# Patient Record
Sex: FEMALE | Race: WHITE
[De-identification: names, ages, dates, MRNs, and addresses within clinical notes are randomized per-mention and may not be internally consistent; named-entity substitution may affect disease eponyms.]

---

## 2017-06-10 ENCOUNTER — HEALTH MAINTENANCE LETTER (OUTPATIENT)
Age: 47
End: 2017-06-10

## 2017-10-27 ENCOUNTER — TRANSFERRED RECORDS (OUTPATIENT)
Dept: HEALTH INFORMATION MANAGEMENT | Facility: CLINIC | Age: 47
End: 2017-10-27

## 2017-12-26 RX ORDER — ERGOCALCIFEROL 1.25 MG/1
50000 CAPSULE, LIQUID FILLED ORAL WEEKLY
COMMUNITY

## 2017-12-27 ENCOUNTER — ANESTHESIA EVENT (OUTPATIENT)
Dept: SURGERY | Facility: CLINIC | Age: 47
End: 2017-12-27
Payer: MEDICARE

## 2017-12-27 ENCOUNTER — HOSPITAL ENCOUNTER (OUTPATIENT)
Facility: CLINIC | Age: 47
Discharge: HOME | End: 2017-12-27
Attending: ORTHOPAEDIC SURGERY | Admitting: ORTHOPAEDIC SURGERY
Payer: MEDICARE

## 2017-12-27 ENCOUNTER — ANESTHESIA (OUTPATIENT)
Dept: SURGERY | Facility: CLINIC | Age: 47
End: 2017-12-27
Payer: MEDICARE

## 2017-12-27 VITALS
DIASTOLIC BLOOD PRESSURE: 74 MMHG | BODY MASS INDEX: 27.79 KG/M2 | HEIGHT: 64 IN | OXYGEN SATURATION: 95 % | SYSTOLIC BLOOD PRESSURE: 110 MMHG | RESPIRATION RATE: 16 BRPM | WEIGHT: 162.8 LBS | TEMPERATURE: 97.9 F

## 2017-12-27 DIAGNOSIS — Z98.890 S/P MEDIAL MENISCECTOMY OF LEFT KNEE: Primary | ICD-10-CM

## 2017-12-27 PROCEDURE — 71000027 ZZH RECOVERY PHASE 2 EACH 15 MINS: Performed by: ORTHOPAEDIC SURGERY

## 2017-12-27 PROCEDURE — 25000128 H RX IP 250 OP 636: Performed by: ANESTHESIOLOGY

## 2017-12-27 PROCEDURE — 25000128 H RX IP 250 OP 636: Performed by: PHYSICIAN ASSISTANT

## 2017-12-27 PROCEDURE — 37000009 ZZH ANESTHESIA TECHNICAL FEE, EACH ADDTL 15 MIN: Performed by: ORTHOPAEDIC SURGERY

## 2017-12-27 PROCEDURE — 71000013 ZZH RECOVERY PHASE 1 LEVEL 1 EA ADDTL HR: Performed by: ORTHOPAEDIC SURGERY

## 2017-12-27 PROCEDURE — 25000128 H RX IP 250 OP 636: Performed by: ORTHOPAEDIC SURGERY

## 2017-12-27 PROCEDURE — 36000058 ZZH SURGERY LEVEL 3 EA 15 ADDTL MIN: Performed by: ORTHOPAEDIC SURGERY

## 2017-12-27 PROCEDURE — 25000128 H RX IP 250 OP 636: Performed by: NURSE ANESTHETIST, CERTIFIED REGISTERED

## 2017-12-27 PROCEDURE — 36000056 ZZH SURGERY LEVEL 3 1ST 30 MIN: Performed by: ORTHOPAEDIC SURGERY

## 2017-12-27 PROCEDURE — 27210995 ZZH RX 272: Performed by: ORTHOPAEDIC SURGERY

## 2017-12-27 PROCEDURE — 25000132 ZZH RX MED GY IP 250 OP 250 PS 637: Mod: GY | Performed by: PHYSICIAN ASSISTANT

## 2017-12-27 PROCEDURE — 25000125 ZZHC RX 250: Performed by: NURSE ANESTHETIST, CERTIFIED REGISTERED

## 2017-12-27 PROCEDURE — 37000008 ZZH ANESTHESIA TECHNICAL FEE, 1ST 30 MIN: Performed by: ORTHOPAEDIC SURGERY

## 2017-12-27 PROCEDURE — A9270 NON-COVERED ITEM OR SERVICE: HCPCS | Mod: GY | Performed by: PHYSICIAN ASSISTANT

## 2017-12-27 PROCEDURE — 27210794 ZZH OR GENERAL SUPPLY STERILE: Performed by: ORTHOPAEDIC SURGERY

## 2017-12-27 PROCEDURE — 25000125 ZZHC RX 250: Performed by: ORTHOPAEDIC SURGERY

## 2017-12-27 PROCEDURE — 25000566 ZZH SEVOFLURANE, EA 15 MIN: Performed by: ORTHOPAEDIC SURGERY

## 2017-12-27 PROCEDURE — 71000012 ZZH RECOVERY PHASE 1 LEVEL 1 FIRST HR: Performed by: ORTHOPAEDIC SURGERY

## 2017-12-27 PROCEDURE — 40000170 ZZH STATISTIC PRE-PROCEDURE ASSESSMENT II: Performed by: ORTHOPAEDIC SURGERY

## 2017-12-27 RX ORDER — PROPOFOL 10 MG/ML
INJECTION, EMULSION INTRAVENOUS CONTINUOUS PRN
Status: DISCONTINUED | OUTPATIENT
Start: 2017-12-27 | End: 2017-12-27

## 2017-12-27 RX ORDER — SODIUM CHLORIDE, SODIUM LACTATE, POTASSIUM CHLORIDE, CALCIUM CHLORIDE 600; 310; 30; 20 MG/100ML; MG/100ML; MG/100ML; MG/100ML
INJECTION, SOLUTION INTRAVENOUS CONTINUOUS PRN
Status: DISCONTINUED | OUTPATIENT
Start: 2017-12-27 | End: 2017-12-27

## 2017-12-27 RX ORDER — CEFAZOLIN SODIUM 2 G/100ML
2 INJECTION, SOLUTION INTRAVENOUS
Status: COMPLETED | OUTPATIENT
Start: 2017-12-27 | End: 2017-12-27

## 2017-12-27 RX ORDER — BUPIVACAINE HYDROCHLORIDE 2.5 MG/ML
INJECTION, SOLUTION EPIDURAL; INFILTRATION; INTRACAUDAL PRN
Status: DISCONTINUED | OUTPATIENT
Start: 2017-12-27 | End: 2017-12-27 | Stop reason: HOSPADM

## 2017-12-27 RX ORDER — ONDANSETRON 4 MG/1
4 TABLET, ORALLY DISINTEGRATING ORAL EVERY 30 MIN PRN
Status: DISCONTINUED | OUTPATIENT
Start: 2017-12-27 | End: 2017-12-27 | Stop reason: HOSPADM

## 2017-12-27 RX ORDER — OXYCODONE HYDROCHLORIDE 5 MG/1
5-10 TABLET ORAL
Qty: 30 TABLET | Refills: 0 | Status: SHIPPED | OUTPATIENT
Start: 2017-12-27

## 2017-12-27 RX ORDER — MEPERIDINE HYDROCHLORIDE 25 MG/ML
12.5 INJECTION INTRAMUSCULAR; INTRAVENOUS; SUBCUTANEOUS
Status: DISCONTINUED | OUTPATIENT
Start: 2017-12-27 | End: 2017-12-27 | Stop reason: HOSPADM

## 2017-12-27 RX ORDER — ONDANSETRON 2 MG/ML
4 INJECTION INTRAMUSCULAR; INTRAVENOUS EVERY 30 MIN PRN
Status: DISCONTINUED | OUTPATIENT
Start: 2017-12-27 | End: 2017-12-27 | Stop reason: HOSPADM

## 2017-12-27 RX ORDER — OXYCODONE HYDROCHLORIDE 5 MG/1
5 TABLET ORAL
Status: COMPLETED | OUTPATIENT
Start: 2017-12-27 | End: 2017-12-27

## 2017-12-27 RX ORDER — LIDOCAINE HYDROCHLORIDE 20 MG/ML
INJECTION, SOLUTION INFILTRATION; PERINEURAL PRN
Status: DISCONTINUED | OUTPATIENT
Start: 2017-12-27 | End: 2017-12-27

## 2017-12-27 RX ORDER — FENTANYL CITRATE 50 UG/ML
INJECTION, SOLUTION INTRAMUSCULAR; INTRAVENOUS PRN
Status: DISCONTINUED | OUTPATIENT
Start: 2017-12-27 | End: 2017-12-27

## 2017-12-27 RX ORDER — FENTANYL CITRATE 50 UG/ML
25-50 INJECTION, SOLUTION INTRAMUSCULAR; INTRAVENOUS
Status: DISCONTINUED | OUTPATIENT
Start: 2017-12-27 | End: 2017-12-27 | Stop reason: HOSPADM

## 2017-12-27 RX ORDER — FENTANYL CITRATE 50 UG/ML
25-50 INJECTION, SOLUTION INTRAMUSCULAR; INTRAVENOUS EVERY 5 MIN PRN
Status: DISCONTINUED | OUTPATIENT
Start: 2017-12-27 | End: 2017-12-27 | Stop reason: HOSPADM

## 2017-12-27 RX ORDER — PHYSOSTIGMINE SALICYLATE 1 MG/ML
1.2 INJECTION INTRAVENOUS
Status: DISCONTINUED | OUTPATIENT
Start: 2017-12-27 | End: 2017-12-27 | Stop reason: HOSPADM

## 2017-12-27 RX ORDER — NALOXONE HYDROCHLORIDE 0.4 MG/ML
.1-.4 INJECTION, SOLUTION INTRAMUSCULAR; INTRAVENOUS; SUBCUTANEOUS
Status: DISCONTINUED | OUTPATIENT
Start: 2017-12-27 | End: 2017-12-27 | Stop reason: HOSPADM

## 2017-12-27 RX ORDER — SODIUM CHLORIDE, SODIUM LACTATE, POTASSIUM CHLORIDE, CALCIUM CHLORIDE 600; 310; 30; 20 MG/100ML; MG/100ML; MG/100ML; MG/100ML
INJECTION, SOLUTION INTRAVENOUS CONTINUOUS
Status: DISCONTINUED | OUTPATIENT
Start: 2017-12-27 | End: 2017-12-27 | Stop reason: HOSPADM

## 2017-12-27 RX ORDER — ONDANSETRON 2 MG/ML
INJECTION INTRAMUSCULAR; INTRAVENOUS PRN
Status: DISCONTINUED | OUTPATIENT
Start: 2017-12-27 | End: 2017-12-27

## 2017-12-27 RX ORDER — HYDROMORPHONE HYDROCHLORIDE 1 MG/ML
.3-.5 INJECTION, SOLUTION INTRAMUSCULAR; INTRAVENOUS; SUBCUTANEOUS EVERY 10 MIN PRN
Status: DISCONTINUED | OUTPATIENT
Start: 2017-12-27 | End: 2017-12-27 | Stop reason: HOSPADM

## 2017-12-27 RX ORDER — PROPOFOL 10 MG/ML
INJECTION, EMULSION INTRAVENOUS PRN
Status: DISCONTINUED | OUTPATIENT
Start: 2017-12-27 | End: 2017-12-27

## 2017-12-27 RX ORDER — ACETAMINOPHEN 325 MG/1
975 TABLET ORAL ONCE
Status: COMPLETED | OUTPATIENT
Start: 2017-12-27 | End: 2017-12-27

## 2017-12-27 RX ORDER — DEXAMETHASONE SODIUM PHOSPHATE 4 MG/ML
INJECTION, SOLUTION INTRA-ARTICULAR; INTRALESIONAL; INTRAMUSCULAR; INTRAVENOUS; SOFT TISSUE PRN
Status: DISCONTINUED | OUTPATIENT
Start: 2017-12-27 | End: 2017-12-27

## 2017-12-27 RX ORDER — BUPIVACAINE HYDROCHLORIDE 2.5 MG/ML
INJECTION, SOLUTION EPIDURAL; INFILTRATION; INTRACAUDAL
Status: DISCONTINUED
Start: 2017-12-27 | End: 2017-12-27 | Stop reason: HOSPADM

## 2017-12-27 RX ADMIN — DEXAMETHASONE SODIUM PHOSPHATE 4 MG: 4 INJECTION, SOLUTION INTRA-ARTICULAR; INTRALESIONAL; INTRAMUSCULAR; INTRAVENOUS; SOFT TISSUE at 13:06

## 2017-12-27 RX ADMIN — CEFAZOLIN SODIUM 2 G: 2 INJECTION, SOLUTION INTRAVENOUS at 12:54

## 2017-12-27 RX ADMIN — FENTANYL CITRATE 50 MCG: 50 INJECTION, SOLUTION INTRAMUSCULAR; INTRAVENOUS at 14:01

## 2017-12-27 RX ADMIN — PROPOFOL 200 MCG/KG/MIN: 10 INJECTION, EMULSION INTRAVENOUS at 12:50

## 2017-12-27 RX ADMIN — FENTANYL CITRATE 50 MCG: 50 INJECTION, SOLUTION INTRAMUSCULAR; INTRAVENOUS at 12:50

## 2017-12-27 RX ADMIN — PROPOFOL 200 MG: 10 INJECTION, EMULSION INTRAVENOUS at 12:50

## 2017-12-27 RX ADMIN — ONDANSETRON 4 MG: 2 INJECTION INTRAMUSCULAR; INTRAVENOUS at 13:15

## 2017-12-27 RX ADMIN — OXYCODONE HYDROCHLORIDE 5 MG: 5 TABLET ORAL at 15:26

## 2017-12-27 RX ADMIN — DEXMEDETOMIDINE HYDROCHLORIDE 16 MCG: 100 INJECTION, SOLUTION INTRAVENOUS at 13:23

## 2017-12-27 RX ADMIN — HYDROMORPHONE HYDROCHLORIDE 0.5 MG: 1 INJECTION, SOLUTION INTRAMUSCULAR; INTRAVENOUS; SUBCUTANEOUS at 14:38

## 2017-12-27 RX ADMIN — MIDAZOLAM 2 MG: 1 INJECTION INTRAMUSCULAR; INTRAVENOUS at 12:50

## 2017-12-27 RX ADMIN — FENTANYL CITRATE 50 MCG: 50 INJECTION, SOLUTION INTRAMUSCULAR; INTRAVENOUS at 14:08

## 2017-12-27 RX ADMIN — SODIUM CHLORIDE, POTASSIUM CHLORIDE, SODIUM LACTATE AND CALCIUM CHLORIDE: 600; 310; 30; 20 INJECTION, SOLUTION INTRAVENOUS at 12:47

## 2017-12-27 RX ADMIN — FENTANYL CITRATE 50 MCG: 50 INJECTION, SOLUTION INTRAMUSCULAR; INTRAVENOUS at 13:10

## 2017-12-27 RX ADMIN — LIDOCAINE HYDROCHLORIDE 60 MG: 20 INJECTION, SOLUTION INFILTRATION; PERINEURAL at 12:50

## 2017-12-27 RX ADMIN — ACETAMINOPHEN 975 MG: 325 TABLET, FILM COATED ORAL at 11:26

## 2017-12-27 NOTE — IP AVS SNAPSHOT
MRN:[id number]                      After Visit Summary   12/27/2017    Sivan Faye    MRN: [id number]           Thank you!     Thank you for choosing [facility] for your care. Our goal is always to provide you with excellent care. Hearing back from our patients is one way we can continue to improve our services. Please take a few minutes to complete the written survey that you may receive in the mail after you visit with us. Thank you!        Patient Information     Date Of Birth          1970        About your hospital stay     You were admitted on:  December 27, 2017 [facility]    You were discharged on:  December 27, 2017       Who to Call     For medical emergencies, please call 911.  For non-urgent questions about your medical care, please call your primary care provider or clinic, [phone]  For questions related to your surgery, please call your surgery clinic        Attending Provider     Provider Freddy Joe MD Orthopaedic Surgery       Primary Care Provider Office Phone # Fax #    Demario Angel Claros [phone]      After Care Instructions     Diet Instructions       Resume pre-procedure diet            Discharge Instructions       Follow up appointment as instructed by Surgeon and or RN            Discharge Instructions       Please provide Dr Freddy Sims's knee arthroscopy discharge instructions            Do not - immerse incision in water until sutures removed       Do not immerse incision in water until sutures removed            Dressing       Keep dressing clean and dry.  Dressing / incisional care as instructed by RN and or Surgeon            No Alcohol       For 24 hours post procedure            No driving or operating machinery        until the day after procedure            Shower       No shower for 24 hours post procedure. May shower Postoperative Day (POD)  2             Weight bearing status - As tolerated                 Your next 10 appointments already scheduled     Dec 29, 2017 12:00 PM CST   (Arrive by 11:45 AM)   New Patient Visit with Sohan Pedersen MD   [facility] ([facility])    [address] [id number] [phone]              Further instructions from your care team       While you were at the hospital today you were given 975 mg of Tylenol at 1126 AM. The recommended daily maximum dose is 4000 mg.     Same Day Surgery Discharge Instructions for  Sedation and General Anesthesia       It's not unusual to feel dizzy, light-headed or faint for up to 24 hours after surgery or while taking pain medication.  If you have these symptoms: sit for a few minutes before standing and have someone assist you when you get up to walk or use the bathroom.      You should rest and relax for the next 24 hours. We recommend you make arrangements to have an adult stay with you for at least 24 hours after your discharge.  Avoid hazardous and strenuous activity.      DO NOT DRIVE any vehicle or operate mechanical equipment for 24 hours following the end of your surgery.  Even though you may feel normal, your reactions may be affected by the medication you have received.      Do not drink alcoholic beverages for 24 hours following surgery.       Slowly progress to your regular diet as you feel able. It's not unusual to feel nauseated and/or vomit after receiving anesthesia.  If you develop these symptoms, drink clear liquids (apple juice, ginger ale, broth, 7-up, etc. ) until you feel better.  If your nausea and vomiting persists for 24 hours, please notify your surgeon.        All narcotic pain medications, along with inactivity and anesthesia, can cause constipation. Drinking plenty of liquids and increasing fiber intake will help.      For any questions of a medical nature, call your  "surgeon.      Do not make important decisions for 24 hours.      If you had general anesthesia, you may have a sore throat for a couple of days related to the breathing tube used during surgery.  You may use Cepacol lozenges to help with this discomfort.  If it worsens or if you develop a fever, contact your surgeon.       If you feel your pain is not well managed with the pain medications prescribed by your surgeon, please contact your surgeon's office to let them know so they can address your concerns.                **If you have questions or concerns about your procedure, call   Dr. Sims at [phone].**        Pending Results     No orders found from 2017 to 2017.            Admission Information     Date & Time Provider Department Dept. Phone    2017 Freddy Sims MD [facility] Same Day Surgery [phone]      Your Vitals Were     Blood Pressure Temperature Respirations Height Weight Last Period    113/70 98.3  F (36.8  C) (Temporal) 16 1.626 m (5' 4\") 73.8 kg (162 lb 12.8 oz) 2010    Pulse Oximetry BMI (Body Mass Index)                94% 27.94 kg/m2          MyChart Information     [employer] lets you send messages to your doctor, view your test results, renew your prescriptions, schedule appointments and more. To sign up, go to [email or web address] . Click on \"Log in\" on the left side of the screen, which will take you to the Welcome page. Then click on \"Sign up Now\" on the right side of the page.     You will be asked to enter the access code listed below, as well as some personal information. Please follow the directions to create your username and password.     Your access code is: [email or web address]  Expires: 3/27/2018  3:18 PM     Your access code will  in 90 days. If you need help or a new code, please call your [place] clinic or [phone].        Care EveryWhere ID     This is your Care EveryWhere ID. This could be used by other organizations to access " your [place] medical records  [id number]        Equal Access to Services     EVELYN NG : Hadii aad ku hadtatiannacelina Solorio, wanicolleda luqalyssaha, qashankarta kaangelablayne calderónhuongblayne, nirmal prajapatitracisean rico. [facility] [phone].    ATENCIÓN: Si habla español, tiene a jamison disposición servicios gratuitos de asistencia lingüística. Llame al [phone].    We comply with applicable federal civil rights laws and [place] laws. We do not discriminate on the basis of race, color, national origin, age, disability, sex, sexual orientation, or gender identity.               Review of your medicines      START taking        Dose / Directions    oxyCODONE IR 5 MG tablet   Commonly known as:  ROXICODONE   Used for:  S/P medial meniscectomy of left knee   Notes to Patient:  One 5 mg oxycodone pain medication given at 3:26 PM        Dose:  5-10 mg   Take 1-2 tablets (5-10 mg) by mouth every 3 hours as needed for pain or other (Moderate to Severe)   Quantity:  30 tablet   Refills:  0         CONTINUE these medicines which have NOT CHANGED        Dose / Directions    * ALBUTEROL INHALER 17GM   Used for:  Mild persistent asthma, Anxiety state, unspecified, Mild persistent asthma, uncomplicated, Chronic neoplasm-related pain        Dose:  1-2 puff   Inhale 1-2 puffs into the lungs daily 1-2 puffs q 4-6 hours prn   Quantity:  3 Inhaler   Refills:  prn       * albuterol (2.5 MG/3ML) 0.083% neb solution   Used for:  Mild persistent asthma, Anxiety state, unspecified, Mild persistent asthma, uncomplicated, Chronic neoplasm-related pain        Dose:  1 vial   Take 1 vial (2.5 mg) by nebulization every 6 hours as needed for shortness of breath / dyspnea   Quantity:  1 Box   Refills:  11       budesonide-formoterol 160-4.5 MCG/ACT Inhaler   Commonly known as:  SYMBICORT   Used for:  Mild persistent asthma, uncomplicated, Anxiety state, unspecified, Mild persistent asthma, Chronic neoplasm-related pain        Dose:  2 puff   Inhale 2  puffs into the lungs 2 times daily   Quantity:  3 Inhaler   Refills:  3       MS CONTIN PO        Dose:  30 mg   Take 30 mg by mouth 4 times daily as needed for moderate to severe pain   Refills:  0       omeprazole 40 MG capsule   Commonly known as:  priLOSEC   Used for:  Esophageal reflux        Dose:  40 mg   Take 1 capsule (40 mg) by mouth daily Take 30-60 minutes before a meal.   Quantity:  30 capsule   Refills:  7       PROTONIX PO        Dose:  50 mg   Take 50 mg by mouth 2 times daily   Refills:  0       QUEtiapine 100 MG tablet   Commonly known as:  SEROquel   Used for:  Anxiety state, unspecified, Mild persistent asthma, uncomplicated, Mild persistent asthma, Chronic neoplasm-related pain        Dose:  300 mg   Take 300 mg by mouth At Bedtime   Quantity:  180 tablet   Refills:  2       SUMAtriptan 20 MG/ACT nasal spray   Commonly known as:  IMITREX   Used for:  Migraine, unspecified, without mention of intractable migraine without mention of status migrainosus        Dose:  1 spray   Spray 1 spray in nostril as needed for migraine.   Quantity:  30 Inhaler   Refills:  0       vitamin D 60643 UNIT capsule   Commonly known as:  ERGOCALCIFEROL        Dose:  55506 Units   Take 50,000 Units by mouth once a week   Refills:  0       XANAX PO        Dose:  2 mg   Take 2 mg by mouth 2 times daily   Refills:  0       * Notice:  This list has 2 medication(s) that are the same as other medications prescribed for you. Read the directions carefully, and ask your doctor or other care provider to review them with you.         Where to get your medicines      Some of these will need a paper prescription and others can be bought over the counter. Ask your nurse if you have questions.     Bring a paper prescription for each of these medications     oxyCODONE IR 5 MG tablet                Protect others around you: Learn how to safely use, store and throw away your medicines at www.disposemymeds.org.             Medication  List: This is a list of all your medications and when to take them. Check marks below indicate your daily home schedule. Keep this list as a reference.      Medications           Morning Afternoon Evening Bedtime As Needed    * ALBUTEROL INHALER 17GM   Inhale 1-2 puffs into the lungs daily 1-2 puffs q 4-6 hours prn                                * albuterol (2.5 MG/3ML) 0.083% neb solution   Take 1 vial (2.5 mg) by nebulization every 6 hours as needed for shortness of breath / dyspnea                                budesonide-formoterol 160-4.5 MCG/ACT Inhaler   Commonly known as:  SYMBICORT   Inhale 2 puffs into the lungs 2 times daily                                MS CONTIN PO   Take 30 mg by mouth 4 times daily as needed for moderate to severe pain                                omeprazole 40 MG capsule   Commonly known as:  priLOSEC   Take 1 capsule (40 mg) by mouth daily Take 30-60 minutes before a meal.                                oxyCODONE IR 5 MG tablet   Commonly known as:  ROXICODONE   Take 1-2 tablets (5-10 mg) by mouth every 3 hours as needed for pain or other (Moderate to Severe)   Last time this was given:  5 mg on 12/27/2017  3:26 PM   Notes to Patient:  One 5 mg oxycodone pain medication given at 3:26 PM                                PROTONIX PO   Take 50 mg by mouth 2 times daily                                QUEtiapine 100 MG tablet   Commonly known as:  SEROquel   Take 300 mg by mouth At Bedtime                                SUMAtriptan 20 MG/ACT nasal spray   Commonly known as:  IMITREX   Spray 1 spray in nostril as needed for migraine.                                vitamin D 84760 UNIT capsule   Commonly known as:  ERGOCALCIFEROL   Take 50,000 Units by mouth once a week                                XANAX PO   Take 2 mg by mouth 2 times daily                                * Notice:  This list has 2 medication(s) that are the same as other medications prescribed for you. Read the  directions carefully, and ask your doctor or other care provider to review them with you.

## 2017-12-27 NOTE — IP AVS SNAPSHOT
[facility] Same Day Surgery    [address]    [place] [address]    Phone:  [phone]    Fax:  [phone]                                       After Visit Summary   12/27/2017    Sivan Faye    MRN: [id number]           After Visit Summary Signature Page     I have received my discharge instructions, and my questions have been answered. I have discussed any challenges I see with this plan with the nurse or doctor.    ..........................................................................................................................................  Patient/Patient Representative Signature      ..........................................................................................................................................  Patient Representative Print Name and Relationship to Patient    ..................................................               ................................................  Date                                            Time    ..........................................................................................................................................  Reviewed by Signature/Title    ...................................................              ..............................................  Date                                                            Time

## 2017-12-27 NOTE — DISCHARGE INSTRUCTIONS
While you were at the hospital today you were given 975 mg of Tylenol at 1126 AM. The recommended daily maximum dose is 4000 mg.     Same Day Surgery Discharge Instructions for  Sedation and General Anesthesia       It's not unusual to feel dizzy, light-headed or faint for up to 24 hours after surgery or while taking pain medication.  If you have these symptoms: sit for a few minutes before standing and have someone assist you when you get up to walk or use the bathroom.      You should rest and relax for the next 24 hours. We recommend you make arrangements to have an adult stay with you for at least 24 hours after your discharge.  Avoid hazardous and strenuous activity.      DO NOT DRIVE any vehicle or operate mechanical equipment for 24 hours following the end of your surgery.  Even though you may feel normal, your reactions may be affected by the medication you have received.      Do not drink alcoholic beverages for 24 hours following surgery.       Slowly progress to your regular diet as you feel able. It's not unusual to feel nauseated and/or vomit after receiving anesthesia.  If you develop these symptoms, drink clear liquids (apple juice, ginger ale, broth, 7-up, etc. ) until you feel better.  If your nausea and vomiting persists for 24 hours, please notify your surgeon.        All narcotic pain medications, along with inactivity and anesthesia, can cause constipation. Drinking plenty of liquids and increasing fiber intake will help.      For any questions of a medical nature, call your surgeon.      Do not make important decisions for 24 hours.      If you had general anesthesia, you may have a sore throat for a couple of days related to the breathing tube used during surgery.  You may use Cepacol lozenges to help with this discomfort.  If it worsens or if you develop a fever, contact your surgeon.       If you feel your pain is not well managed with the pain medications prescribed by your surgeon, please  contact your surgeon's office to let them know so they can address your concerns.                **If you have questions or concerns about your procedure, call   Dr. Sims at [phone].**

## 2017-12-27 NOTE — ANESTHESIA PREPROCEDURE EVALUATION
Anesthesia Evaluation     . Pt has had prior anesthetic. Type: General           ROS/MED HX    ENT/Pulmonary:     (+)Intermittent asthma , . .    Neurologic:       Cardiovascular: Comment: PFO        METS/Exercise Tolerance:     Hematologic:         Musculoskeletal:         GI/Hepatic:     (+) GERD       Renal/Genitourinary:         Endo:         Psychiatric:     (+) psychiatric history depression      Infectious Disease:         Malignancy:   (+) Malignancy History of Skin  Other CA metastatic melanoma status post         Other:                                    Anesthesia Plan      History & Physical Review  History and physical reviewed and following examination; no interval change.    ASA Status:  3 .        Plan for General with Intravenous induction. Maintenance will be TIVA.    PONV prophylaxis:  Ondansetron (or other 5HT-3) and Dexamethasone or Solumedrol  Propofol, Zofran, and decadron      Postoperative Care  Postoperative pain management:  IV analgesics and Oral pain medications.      Consents  Anesthetic plan, risks, benefits and alternatives discussed with:  Patient..                          .

## 2017-12-27 NOTE — ANESTHESIA CARE TRANSFER NOTE
Patient: Sivan Faye    Procedure(s):  left knee arthroscopy  partial medial meniscectomy - Wound Class: I-Clean    Diagnosis: MENISCUS TEAR LEFT KNEE  Diagnosis Additional Information: No value filed.    Anesthesia Type:   General     Note:  Airway :Face Mask  Patient transferred to:PACU  Comments: Pt exhibits spont resps, all monitors and alarms on in pacu, report given to RN, vss.Handoff Report: Identifed the Patient, Identified the Reponsible Provider, Reviewed the pertinent medical history, Discussed the surgical course, Reviewed Intra-OP anesthesia mangement and issues during anesthesia, Set expectations for post-procedure period and Allowed opportunity for questions and acknowledgement of understanding      Vitals: (Last set prior to Anesthesia Care Transfer)    CRNA VITALS  12/27/2017 1319 - 12/27/2017 1353      12/27/2017             Pulse: 87    SpO2: 96 %    Resp Rate (set): 10                Electronically Signed By: GODFREY Gloria CRNA  December 27, 2017  1:53 PM

## 2017-12-27 NOTE — ANESTHESIA POSTPROCEDURE EVALUATION
Patient: Sivan Faye    Procedure(s):  left knee arthroscopy  partial medial meniscectomy - Wound Class: I-Clean    Diagnosis:MENISCUS TEAR LEFT KNEE  Diagnosis Additional Information: No value filed.    Anesthesia Type:  General    Note:  Anesthesia Post Evaluation    Patient location during evaluation: PACU  Patient participation: Able to fully participate in evaluation  Level of consciousness: awake  Pain management: adequate  Airway patency: patent  Cardiovascular status: acceptable  Respiratory status: acceptable  Hydration status: acceptable  PONV: controlled     Anesthetic complications: None          Last vitals:  Vitals:    12/27/17 1401 12/27/17 1408 12/27/17 1415   BP: 108/67  114/66   Resp: (!) 54  11   Temp:      SpO2: 97% 96% 97%         Electronically Signed By: Emerson Stewart MD  December 27, 2017  2:23 PM

## 2017-12-27 NOTE — BRIEF OP NOTE
[facility] Brief Operative Note    Pre-operative diagnosis: MENISCUS TEAR LEFT KNEE   Post-operative diagnosis Left knee medial meniscus tear, ACL deficiency, chondromalasia, plica   Procedure: Left knee arthroscopy, PMM, chondroplasty trochlea, plica excision   Surgeon(s): Surgeon(s) and Role:     * Freddy Sims MD - Primary   Estimated blood loss: 5cc   Specimens: None   Findings: See op note

## 2017-12-28 NOTE — OP NOTE
DATE OF PROCEDURE:  12/27/2017      PREOPERATIVE DIAGNOSES:   1.  Left knee anterior cruciate ligament deficiency.   2.  Left knee bucket handle medial meniscus tear.   3.  Chondromalacia trochlea.   4.  Impinging medial shelf plica.        POSTOPERATIVE DIAGNOSES:   1.  Left knee anterior cruciate ligament deficiency.   2.  Left knee bucket handle medial meniscus tear.   3.  Chondromalacia trochlea.   4.  Impinging medial shelf plica.        PROCEDURES:   1.  Left knee arthroscopy.   2.  Partial medial meniscectomy.   3.  Chondroplasty trochlea.   4.  Excision medial shelf plica.   5.  Debridement of anterior cruciate ligament stump.      SURGEON:  Freddy Sims MD      ASSISTANT:  AUTUMN PATINO PA-C      COMPLICATIONS:  None.        INDICATION:  Sivan Faye was brought to the operating room 12/27/2017 for elective left knee arthroscopy.  She has a complex past medical history.  She has metastatic malignant melanoma.  She has had significant treatment for her cancer.  She has been experiencing pain and dysfunction related to her left knee.  A preoperative MRI demonstrates evidence of ACL insufficiency and a bucket handle medial meniscus tear.  Based on review of her history and MRI findings we made the decision to proceed with a relatively simple surgical intervention, specifically simple arthroscopy with partial medial meniscectomy, chondroplasty and debridement of her ACL.  We made this decision based on my recommendations to avoid a longer more involved rehabilitation process considering some of her medical issues.  She also has chronic pain and takes several narcotic pain medications.  She was seen day of surgery in the preoperative holding area, and the left knee was marked as the correct operative site.  Received a dose of IV cefazolin 30 minutes prior to surgical incision.  No post-surgical antibiotic or formal DVT prophylaxis is indicated and nothing will be prescribed.  Skilled assistant was used for  positioning, draping, retraction, closure and dressing application.      DESCRIPTION OF PROCEDURE:  The patient was brought to the operating room, placed under a general anesthesia.  She was positioned supine with the left lower extremity prepped and draped in the standard sterile fashion.  Exam under anesthesia demonstrated 2+ Lachman and positive pivot shift.  Otherwise, her knee was ligamentously stable.  Preoperative timeout was taken, and thigh tourniquet inflated to mL 300 mmHg.  Arthroscopy of the left knee was carried out using standard anterolateral and anteromedial portals.  Undersurface of the patella was noted to be in good condition without significant chondromalacia.  The anteromedial trochlea was noted to have a focus of grade III chondromalacia.  Chondroplasty was performed to a stable margin.  There was a thickened impinging medial shelf plica noted in the area of chondromalacia.  Plica excision performed with the shaver to a stable margin.  Lateral gutter unremarkable.  Examination of the medial compartment demonstrated displaced bucket handle medial meniscus tear.  I reduced the flap.  I proceeded with our plan to perform partial medial meniscectomy.  I removed the flap by releasing it from its anterior, posterior horn attachments and removing the flap with a shaver.  Articular cartilage in the medial compartment was in good condition.  Examination of the notch demonstrated a nearly completely torn anterior cruciate ligament.  Small percentage of posterior ligament fibers were still intact.  I debrided the ACL stump which was flipped forward within the intercondylar notch.  Examination of the lateral compartment demonstrated healthy-appearing lateral meniscus without tear.  Normal articular cartilage.  The joint was lavaged with arthroscopic fluid and suctioned dry.  Portals were closed with Monocryl suture.  Joint was injected with 20 mL 0.25% Marcaine.  Sterile dressing applied.  Tourniquet  deflated after an approximate tourniquet time of 30 minutes.  The patient was extubated and brought to the recovery room in stable condition.  Needle and lap counts were correct at the end of the case.  There were no known complications.         ISRAEL RIVERA MD             D: 2017 13:42   T: 2017 09:54   MT: [id number]      Name:     SHAY FLORES   MRN:      [id number]        Account:        [id number]   :      1970           Procedure Date: 2017      Document: [id number]

## 2018-01-03 ENCOUNTER — OFFICE VISIT (OUTPATIENT)
Dept: PALLIATIVE CARE | Facility: CLINIC | Age: 48
End: 2018-01-03
Attending: INTERNAL MEDICINE
Payer: MEDICARE

## 2018-01-03 VITALS
RESPIRATION RATE: 16 BRPM | BODY MASS INDEX: 27.91 KG/M2 | HEART RATE: 99 BPM | TEMPERATURE: 97.2 F | SYSTOLIC BLOOD PRESSURE: 124 MMHG | OXYGEN SATURATION: 97 % | DIASTOLIC BLOOD PRESSURE: 81 MMHG | WEIGHT: 163.5 LBS | HEIGHT: 64 IN

## 2018-01-03 DIAGNOSIS — G89.3 CHRONIC NEOPLASM-RELATED PAIN: ICD-10-CM

## 2018-01-03 DIAGNOSIS — C43.71 MALIGNANT MELANOMA OF RIGHT LOWER EXTREMITY INCLUDING HIP (H): Primary | ICD-10-CM

## 2018-01-03 PROCEDURE — 99205 OFFICE O/P NEW HI 60 MIN: CPT | Mod: ZP | Performed by: INTERNAL MEDICINE

## 2018-01-03 PROCEDURE — G0463 HOSPITAL OUTPT CLINIC VISIT: HCPCS

## 2018-01-03 RX ORDER — AMITRIPTYLINE HYDROCHLORIDE 50 MG/1
50 TABLET ORAL
COMMUNITY
Start: 2017-10-04

## 2018-01-03 RX ORDER — FLUCONAZOLE 150 MG/1
TABLET ORAL
Refills: 0 | COMMUNITY
Start: 2017-11-01

## 2018-01-03 ASSESSMENT — PAIN SCALES - GENERAL: PAINLEVEL: SEVERE PAIN (7)

## 2018-01-03 NOTE — LETTER
1/3/2018       RE: Sivan Faye  [address] [facility] [address]     Dear Colleague,    Thank you for referring your patient, Sivan Faye, to the [facility] at [facility]. Please see a copy of my visit note below.    Palliative Staff/Outpatient Clinic    (This note was transcribed using voice recognition software. While I review and edit the transcription, I may miss errors. Please let me know of any serious mis-transcriptions and I will addend this note.)    CC/Patient ID:  Melanoma, pain    History:  I have outside records from [place] that she brought in which is able to review.  Briefly she is a history of metastatic melanoma dating back to 2011.  She has had multiple interventions and procedures for this.  Most notably she had has had several resections of her right buttock and thigh area along with other therapies.  She has chronic neuralgic pain in that area which she has lived with for years.    She saw an outside pain doctor who called me and asked me to take over her care and she is hoping that I will take over prescribing her pain medicine today.    She has been on chronic opiate therapy in this context.  She is not the clearest history giver but my sense that she has been on 30 mg 4 times a day of MS Contin for about 2 years.  Her primary care doctors be prescribing it for a while now.  She tells me that her primary care doctor told her that she would not be able to prescribe this for her after April 2018 unless the patient got a second opinion or sore pain doctor.  In the midst of all this the patient used cocaine and primary care doctor said she was not going to give her any more opiates.  I will note that the patient admits to me that she used cocaine and also says it essentially was a dull mistake but was not a big deal.  She also tells me that she needs opiates to live adequately, and understood that if she used cocaine she  would not be able to get the opiates anymore and still uses cocaine.  I know that she was admitted with pneumonia tablet in October of last year and she was found to have cocaine in her system then.  I also note she cocaine in her urine in 2009 here.    I note her pain clinic consult from 2015 in our system.  At that time our pain team colleagues expressed worry the patient had a substance use disorder and recommended chemical dependency evaluation and did not think the patient was a good candidate for comprehensive pain management or chronic opiate therapy.  They did not think she was a good candidate for comprehensive pain management due to the patient's lack of interest in comprehensive treatments.    I reviewed her medical records.  She is really not had any evidence of active cancer for several years now.  She has nonspecific ill-defined groundglass opacity in her lungs which do not have a proper diagnosis but are not clearly malignant.  She recently had something in her left groin on a PET scan which was biopsied and shown to be benign.    She has been running out of her pain medicines and so she has been rationing them.  She has been cutting her MS Contin tabs in half.  I note a couple years ago she told the pain clinic here she was cutting her OxyContin tabs in half and was told not to do this.  Her orthopedic surgeon just gave her over 30 tabs of oxycodone and she has been using those to taper a little bit.  She tells me that actually tapering has not been that difficult for her and her pain is overall okay as is her function.  She has not had any symptoms of withdrawal. She describes multiple nonopioid pain medicines which have not helped in the past; a lot of this is nicely summarized in the pain clinic note from 2 y ago.     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  Comprehensive review of systems is  "negative otherwise    PE: /81  Pulse 99  Temp 97.2  F (36.2  C) (Oral)  Resp 16  Ht 1.626 m (5' 4.02\")  Wt 74.2 kg (163 lb 8 oz)  LMP 07/26/2010  SpO2 97%  BMI 28.05 kg/m2  Alert pleasant well-groomed woman in no acute distress.  Affect is full pleasant interactive with a clean sensorium fluent speech normal memory and thought processes.  She is loquacious.  Right buttock and thigh have extensive operative and radiation changes which are well-healed both on the buttock and anterior and posterior thigh.    Impression & Recommendations:  47-year-old woman with metastatic melanoma who is currently in a sort of remission with chronic neuralgic pain related to her prior treatment, & intermittent cocaine use at minimum.  I reviewed her situation at length with her.  Despite her minimization of her cocaine abuse I pointed out to her that in fact the very definition of having a problem with a substance use disorder is using it despite significant medical and social harm and the fact that she cannot stop herself from using it even though she knows it means she is going to have her opiates stopped is fairly alarming to me.  I encouraged her to consider that she has problems with chemical dependency.  I think this is difficult for her but she did seem to engage in and have some insight into this.  I talked about Narcotics Anonymous and seeking me to help groups or outpatient chemical dependency treatment.      She does not have a strong indication for ongoing high-dose opiate therapy given that her cancer is in remission and her pain is neuralgic.  In addition she demonstrates ongoing reckless behavior with her medications as evidenced by cutting her extended release tablets in half and that she knows she should not do that.  She also does not demonstrate significant insight into how alarming her behavior is.  I told her this frankly.  I recommended that she continue to taper herself off her opiates and gave her " advice about how to do that with what she has left of her few MS Contin pills and oxycodone.  We talked about opioid dependence and how she would likely feel poorly for a few weeks to couple months and then will start feeling better as her brain starts making endogenous opiates again.  I talked about alarming withdrawal symptoms want to seek emergency care for, for instance if she is getting dehydrated.  I recommended she go to .  I recommended close follow-up with her primary care doctor.    She asked insightful questions about this and seem to accept my recommendations and thanked me for the visit.    -The above was transcribed using voice recognition software. While I review and edit the transcription, I may miss errors. Please let me know of any serious mis-transcriptions and I will addend this note.    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     Endometriosis, site unspecified      Gastroesophageal reflux disease      Hyperlipidemia      Impaired fasting glucose      Lymphedema     Right leg     Melanoma (H)     Right gluteal region, s/p pelvic node excision, chemo     Mild intermittent asthma      Osteopenia     Previously on Lupron     PFO (patent foramen ovale)     No history of embolic phenomenon     Past Surgical History:   Procedure Laterality Date     APPENDECTOMY       ARTHROSCOPY KNEE Left 12/27/2017    Procedure: ARTHROSCOPY KNEE;  left knee  arthroscopy  partial medial meniscectomy;  Surgeon: Freddy Sims MD;  Location:  [facility]     CHOLECYSTECTOMY, LAPOROSCOPIC  5/2010     HC ENLARGE BREAST WITH IMPLANT       SOFT TISSUE SURGERY      18 surgeries for melanoma right gluteal region, needle biopsies     SURGICAL HISTORY OF -   2001    Dx-lap - endometriosis - X7     SURGICAL HISTORY OF -   9/2009    Right knee arthroscopy, removal of right tibial screw.     Allergies   Allergen Reactions     Compazine      tachycardia     Demerol Itching          Medications:   I have reviewed this patient's medication profile.  MNPMP: reviewed      Data reviewed:  I reviewed recent labs and imaging, comments on pertinent findings:    Thank you for involving us in the patient's care. Over 60 min face to face time today over half counseling    Sohan Pedersen MD / Palliative Medicine / Pager [phone] / [facility] Inpatient Team Consult Pager [phone] (answered 8am-430pm M-F) - ok to text page via [employer] / After-Hours Answering Service [phone] / Palliative Clinic in the [facility] the [facility] - [phone] (scheduling); [phone] (triage).

## 2018-01-03 NOTE — NURSING NOTE
"Oncology Rooming Note    January 3, 2018 12:14 PM   Sivan Faye is a 47 year old female who presents for:    Chief Complaint   Patient presents with     Oncology Clinic Visit     Palliatve Care      Initial Vitals: /81  Pulse 99  Temp 97.2  F (36.2  C) (Oral)  Resp 16  Ht 1.626 m (5' 4.02\")  Wt 74.2 kg (163 lb 8 oz)  LMP 07/26/2010  SpO2 97%  BMI 28.05 kg/m2 Estimated body mass index is 28.05 kg/(m^2) as calculated from the following:    Height as of this encounter: 1.626 m (5' 4.02\").    Weight as of this encounter: 74.2 kg (163 lb 8 oz). Body surface area is 1.83 meters squared.  Severe Pain (7) Comment: pain everyday   Patient's last menstrual period was 07/26/2010.  Allergies reviewed: Yes  Medications reviewed: Yes    Medications: Medication refills not needed today.  Pharmacy name entered into [facility]:    [facility] [place], [place] - [address] [facility] - [place], [place] - [address] [facility] [address] [place], [place] - [address] AT [facility] [address]   [facility] [address] [place], [place] - [address] AT [facility] [address]    Clinical concerns: no new concerns   Pt received flu shot elsewhere. See Immunizations     6 minutes for nursing intake (face to face time)     Deborah Roy CMA                "

## 2018-01-03 NOTE — PROGRESS NOTES
Palliative Staff/Outpatient Clinic    (This note was transcribed using voice recognition software. While I review and edit the transcription, I may miss errors. Please let me know of any serious mis-transcriptions and I will addend this note.)    CC/Patient ID:  Melanoma, pain    History:  I have outside records from [place] that she brought in which is able to review.  Briefly she is a history of metastatic melanoma dating back to 2011.  She has had multiple interventions and procedures for this.  Most notably she had has had several resections of her right buttock and thigh area along with other therapies.  She has chronic neuralgic pain in that area which she has lived with for years.    She saw an outside pain doctor who called me and asked me to take over her care and she is hoping that I will take over prescribing her pain medicine today.    She has been on chronic opiate therapy in this context.  She is not the clearest history giver but my sense that she has been on 30 mg 4 times a day of MS Contin for about 2 years.  Her primary care doctors be prescribing it for a while now.  She tells me that her primary care doctor told her that she would not be able to prescribe this for her after April 2018 unless the patient got a second opinion or sore pain doctor.  In the midst of all this the patient used cocaine and primary care doctor said she was not going to give her any more opiates.  I will note that the patient admits to me that she used cocaine and also says it essentially was a dull mistake but was not a big deal.  She also tells me that she needs opiates to live adequately, and understood that if she used cocaine she would not be able to get the opiates anymore and still uses cocaine.  I know that she was admitted with pneumonia tablet in October of last year and she was found to have cocaine in her system then.  I also note she cocaine in her urine in 2009 here.    I note her pain clinic consult from 2015  "in our system.  At that time our pain team colleagues expressed worry the patient had a substance use disorder and recommended chemical dependency evaluation and did not think the patient was a good candidate for comprehensive pain management or chronic opiate therapy.  They did not think she was a good candidate for comprehensive pain management due to the patient's lack of interest in comprehensive treatments.    I reviewed her medical records.  She is really not had any evidence of active cancer for several years now.  She has nonspecific ill-defined groundglass opacity in her lungs which do not have a proper diagnosis but are not clearly malignant.  She recently had something in her left groin on a PET scan which was biopsied and shown to be benign.    She has been running out of her pain medicines and so she has been rationing them.  She has been cutting her MS Contin tabs in half.  I note a couple years ago she told the pain clinic here she was cutting her OxyContin tabs in half and was told not to do this.  Her orthopedic surgeon just gave her over 30 tabs of oxycodone and she has been using those to taper a little bit.  She tells me that actually tapering has not been that difficult for her and her pain is overall okay as is her function.  She has not had any symptoms of withdrawal. She describes multiple nonopioid pain medicines which have not helped in the past; a lot of this is nicely summarized in the pain clinic note from 2 y ago.     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  Comprehensive review of systems is negative otherwise    PE: /81  Pulse 99  Temp 97.2  F (36.2  C) (Oral)  Resp 16  Ht 1.626 m (5' 4.02\")  Wt 74.2 kg (163 lb 8 oz)  LMP 07/26/2010  SpO2 97%  BMI 28.05 kg/m2  Alert pleasant well-groomed woman in no acute distress.  Affect is full pleasant interactive with a clean sensorium " fluent speech normal memory and thought processes.  She is loquacious.  Right buttock and thigh have extensive operative and radiation changes which are well-healed both on the buttock and anterior and posterior thigh.    Impression & Recommendations:  47-year-old woman with metastatic melanoma who is currently in a sort of remission with chronic neuralgic pain related to her prior treatment, & intermittent cocaine use at minimum.  I reviewed her situation at length with her.  Despite her minimization of her cocaine abuse I pointed out to her that in fact the very definition of having a problem with a substance use disorder is using it despite significant medical and social harm and the fact that she cannot stop herself from using it even though she knows it means she is going to have her opiates stopped is fairly alarming to me.  I encouraged her to consider that she has problems with chemical dependency.  I think this is difficult for her but she did seem to engage in and have some insight into this.  I talked about Narcotics Anonymous and seeking me to help groups or outpatient chemical dependency treatment.      She does not have a strong indication for ongoing high-dose opiate therapy given that her cancer is in remission and her pain is neuralgic.  In addition she demonstrates ongoing reckless behavior with her medications as evidenced by cutting her extended release tablets in half and that she knows she should not do that.  She also does not demonstrate significant insight into how alarming her behavior is.  I told her this frankly.  I recommended that she continue to taper herself off her opiates and gave her advice about how to do that with what she has left of her few MS Contin pills and oxycodone.  We talked about opioid dependence and how she would likely feel poorly for a few weeks to couple months and then will start feeling better as her brain starts making endogenous opiates again.  I talked about  alarming withdrawal symptoms want to seek emergency care for, for instance if she is getting dehydrated.  I recommended she go to .  I recommended close follow-up with her primary care doctor.    She asked insightful questions about this and seem to accept my recommendations and thanked me for the visit.    -The above was transcribed using voice recognition software. While I review and edit the transcription, I may miss errors. Please let me know of any serious mis-transcriptions and I will addend this note.    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     Endometriosis, site unspecified      Gastroesophageal reflux disease      Hyperlipidemia      Impaired fasting glucose      Lymphedema     Right leg     Melanoma (H)     Right gluteal region, s/p pelvic node excision, chemo     Mild intermittent asthma      Osteopenia     Previously on Lupron     PFO (patent foramen ovale)     No history of embolic phenomenon     Past Surgical History:   Procedure Laterality Date     APPENDECTOMY       ARTHROSCOPY KNEE Left 12/27/2017    Procedure: ARTHROSCOPY KNEE;  left knee arthroscopy  partial medial meniscectomy;  Surgeon: Freddy Sims MD;  Location:  [facility]     CHOLECYSTECTOMY, LAPOROSCOPIC  5/2010     HC ENLARGE BREAST WITH IMPLANT       SOFT TISSUE SURGERY      18 surgeries for melanoma right gluteal region, needle biopsies     SURGICAL HISTORY OF -   2001     Dx-lap - endometriosis - X7     SURGICAL HISTORY OF -   9/2009    Right knee arthroscopy, removal of right tibial screw.     Allergies   Allergen Reactions     Compazine      tachycardia     Demerol Itching          Medications:   I have reviewed this patient's medication profile.  MNPMP: reviewed      Data reviewed:  I reviewed recent labs and imaging, comments on pertinent findings:    Thank you for involving us in the patient's care. Over 60 min face to face time today over half counseling  Sohan Pedersen MD / Palliative Medicine / Pager [phone] / [facility] Inpatient Team Consult Pager [phone] (answered 8am-430pm M-F) - ok to text page via [employer] / After-Hours Answering Service [phone] / Palliative Clinic in the [facility] at the [facility] - [phone] (scheduling); [phone] (triage).

## 2018-01-03 NOTE — MR AVS SNAPSHOT
"              After Visit Summary   1/3/2018    Sivan Faye    MRN: [id number]           Patient Information     Date Of Birth          1970        Visit Information        Provider Department      1/3/2018 12:30 PM Sohan Pedersen MD [facility]        Today's Diagnoses     Malignant melanoma of right lower extremity including hip (H)    -  1    Chronic neoplasm-related pain           Follow-ups after your visit        Who to contact     If you have questions or need follow up information about today's clinic visit or your schedule please contact [facility] directly at [phone].  Normal or non-critical lab and imaging results will be communicated to you by MyChart, letter or phone within 4 business days after the clinic has received the results. If you do not hear from us within 7 days, please contact the clinic through [employer] or phone. If you have a critical or abnormal lab result, we will notify you by phone as soon as possible.  Submit refill requests through [employer] or call your pharmacy and they will forward the refill request to us. Please allow 3 business days for your refill to be completed.          Additional Information About Your Visit        MyChart Information     [employer] lets you send messages to your doctor, view your test results, renew your prescriptions, schedule appointments and more. To sign up, go to [email or web address] . Click on \"Log in\" on the left side of the screen, which will take you to the Welcome page. Then click on \"Sign up Now\" on the right side of the page.     You will be asked to enter the access code listed below, as well as some personal information. Please follow the directions to create your username and password.     Your access code is: [email or web address]  Expires: 3/27/2018  3:18 PM     Your access code will  in 90 days. If you need help or a new code, please call your [place] clinic or [phone].      " "  Care EveryWhere ID     This is your Care EveryWhere ID. This could be used by other organizations to access your [place] medical records  [id number]        Your Vitals Were     Pulse Temperature Respirations Height Last Period Pulse Oximetry    99 97.2  F (36.2  C) (Oral) 16 1.626 m (5' 4.02\") 07/26/2010 97%    BMI (Body Mass Index)                   28.05 kg/m2            Blood Pressure from Last 3 Encounters:   01/03/18 124/81   12/27/17 110/74   08/13/15 104/66    Weight from Last 3 Encounters:   01/03/18 74.2 kg (163 lb 8 oz)   12/27/17 73.8 kg (162 lb 12.8 oz)   08/13/15 68.4 kg (150 lb 12.8 oz)              Today, you had the following     No orders found for display       Primary Care Provider Office Phone # Fax #    Demario Claros [phone]       [address] [place] [address]        Equal Access to Services     [facility] HospitalGONZ[facility]LEZ : Hadii jamil ku hadasho Soomaali, waaxda luqadaha, qaybta kaalmada adeegyablayne, nirmal hammer . So [facility] [phone].    ATENCIÓN: Si habla español, tiene a jamison disposición servicios gratuitos de asistencia lingüística. [facility] [phone].    We comply with applicable federal civil rights laws and [place] laws. We do not discriminate on the basis of race, color, national origin, age, disability, sex, sexual orientation, or gender identity.            Thank you!     Thank you for choosing [facility]  for your care. Our goal is always to provide you with excellent care. Hearing back from our patients is one way we can continue to improve our services. Please take a few minutes to complete the written survey that you may receive in the mail after your visit with us. Thank you!             Your Updated Medication List - Protect others around you: Learn how to safely use, store and throw away your medicines at www.disposemymeds.org.          This list is accurate as of: 1/3/18  2:08 PM.  Always use your most " recent med list.                   Brand Name Dispense Instructions for use Diagnosis    * ALBUTEROL INHALER 17GM     3 Inhaler    Inhale 1-2 puffs into the lungs daily 1-2 puffs q 4-6 hours prn    Mild persistent asthma, Anxiety state, unspecified, Mild persistent asthma, uncomplicated, Chronic neoplasm-related pain       * albuterol (2.5 MG/3ML) 0.083% neb solution     1 Box    Take 1 vial (2.5 mg) by nebulization every 6 hours as needed for shortness of breath / dyspnea    Mild persistent asthma, Anxiety state, unspecified, Mild persistent asthma, uncomplicated, Chronic neoplasm-related pain       amitriptyline 50 MG tablet    ELAVIL     Take 50 mg by mouth        budesonide-formoterol 160-4.5 MCG/ACT Inhaler    SYMBICORT    3 Inhaler    Inhale 2 puffs into the lungs 2 times daily    Mild persistent asthma, uncomplicated, Anxiety state, unspecified, Mild persistent asthma, Chronic neoplasm-related pain       esomeprazole 20 MG CR capsule    nexIUM     Take 20 mg by mouth        fluconazole 150 MG tablet    DIFLUCAN     TK 1 T PO 1 TIME. MAY REPEAT AFTER 3 DAYS        FLUOXETINE HCL PO           MS CONTIN PO      Take 30 mg by mouth 4 times daily as needed for moderate to severe pain        omeprazole 40 MG capsule    priLOSEC    30 capsule    Take 1 capsule (40 mg) by mouth daily Take 30-60 minutes before a meal.    Esophageal reflux       oxyCODONE IR 5 MG tablet    ROXICODONE    30 tablet    Take 1-2 tablets (5-10 mg) by mouth every 3 hours as needed for pain or other (Moderate to Severe)    S/P medial meniscectomy of left knee       PROTONIX PO      Take 50 mg by mouth 2 times daily        QUEtiapine 100 MG tablet    SEROquel    180 tablet    Take 300 mg by mouth At Bedtime    Anxiety state, unspecified, Mild persistent asthma, uncomplicated, Mild persistent asthma, Chronic neoplasm-related pain       SUMAtriptan 20 MG/ACT nasal spray    IMITREX    30 Inhaler    Spray 1 spray in nostril as needed for migraine.     Migraine, unspecified, without mention of intractable migraine without mention of status migrainosus       vitamin D 00348 UNIT capsule    ERGOCALCIFEROL     Take 50,000 Units by mouth once a week        XANAX PO      Take 2 mg by mouth 2 times daily        * Notice:  This list has 2 medication(s) that are the same as other medications prescribed for you. Read the directions carefully, and ask your doctor or other care provider to review them with you.

## 2018-01-03 NOTE — LETTER
1/3/2018       RE: Sivan Faye  [address] [facility] [address]     Dear Colleague,    Thank you for referring your patient, Sivan Faye, to the [facility] CLINIC at [facility]. Please see a copy of my visit note below.    Palliative Staff/Outpatient Clinic    (This note was transcribed using voice recognition software. While I review and edit the transcription, I may miss errors. Please let me know of any serious mis-transcriptions and I will addend this note.)    CC/Patient ID:  Melanoma, pain    History:  I have outside records from [place] that she brought in which is able to review.  Briefly she is a history of metastatic melanoma dating back to 2011.  She has had multiple interventions and procedures for this.  Most notably she had has had several resections of her right buttock and thigh area along with other therapies.  She has chronic neuralgic pain in that area which she has lived with for years.    She saw an outside pain doctor who called me and asked me to take over her care and she is hoping that I will take over prescribing her pain medicine today.    She has been on chronic opiate therapy in this context.  She is not the clearest history giver but my sense that she has been on 30 mg 4 times a day of MS Contin for about 2 years.  Her primary care doctors be prescribing it for a while now.  She tells me that her primary care doctor told her that she would not be able to prescribe this for her after April 2018 unless the patient got a second opinion or sore pain doctor.  In the midst of all this the patient used cocaine and primary care doctor said she was not going to give her any more opiates.  I will note that the patient admits to me that she used cocaine and also says it essentially was a dull mistake but was not a big deal.  She also tells me that she needs opiates to live adequately, and understood that if she used cocaine she  would not be able to get the opiates anymore and still uses cocaine.  I know that she was admitted with pneumonia tablet in October of last year and she was found to have cocaine in her system then.  I also note she cocaine in her urine in 2009 here.    I note her pain clinic consult from 2015 in our system.  At that time our pain team colleagues expressed worry the patient had a substance use disorder and recommended chemical dependency evaluation and did not think the patient was a good candidate for comprehensive pain management or chronic opiate therapy.  They did not think she was a good candidate for comprehensive pain management due to the patient's lack of interest in comprehensive treatments.    I reviewed her medical records.  She is really not had any evidence of active cancer for several years now.  She has nonspecific ill-defined groundglass opacity in her lungs which do not have a proper diagnosis but are not clearly malignant.  She recently had something in her left groin on a PET scan which was biopsied and shown to be benign.    She has been running out of her pain medicines and so she has been rationing them.  She has been cutting her MS Contin tabs in half.  I note a couple years ago she told the pain clinic here she was cutting her OxyContin tabs in half and was told not to do this.  Her orthopedic surgeon just gave her over 30 tabs of oxycodone and she has been using those to taper a little bit.  She tells me that actually tapering has not been that difficult for her and her pain is overall okay as is her function.  She has not had any symptoms of withdrawal. She describes multiple nonopioid pain medicines which have not helped in the past; a lot of this is nicely summarized in the pain clinic note from 2 y ago.     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  Comprehensive review of systems is  "negative otherwise    PE: /81  Pulse 99  Temp 97.2  F (36.2  C) (Oral)  Resp 16  Ht 1.626 m (5' 4.02\")  Wt 74.2 kg (163 lb 8 oz)  LMP 07/26/2010  SpO2 97%  BMI 28.05 kg/m2  Alert pleasant well-groomed woman in no acute distress.  Affect is full pleasant interactive with a clean sensorium fluent speech normal memory and thought processes.  She is loquacious.  Right buttock and thigh have extensive operative and radiation changes which are well-healed both on the buttock and anterior and posterior thigh.    Impression & Recommendations:  47-year-old woman with metastatic melanoma who is currently in a sort of remission with chronic neuralgic pain related to her prior treatment, & intermittent cocaine use at minimum.  I reviewed her situation at length with her.  Despite her minimization of her cocaine abuse I pointed out to her that in fact the very definition of having a problem with a substance use disorder is using it despite significant medical and social harm and the fact that she cannot stop herself from using it even though she knows it means she is going to have her opiates stopped is fairly alarming to me.  I encouraged her to consider that she has problems with chemical dependency.  I think this is difficult for her but she did seem to engage in and have some insight into this.  I talked about Narcotics Anonymous and seeking me to help groups or outpatient chemical dependency treatment.      She does not have a strong indication for ongoing high-dose opiate therapy given that her cancer is in remission and her pain is neuralgic.  In addition she demonstrates ongoing reckless behavior with her medications as evidenced by cutting her extended release tablets in half and that she knows she should not do that.  She also does not demonstrate significant insight into how alarming her behavior is.  I told her this frankly.  I recommended that she continue to taper herself off her opiates and gave her " advice about how to do that with what she has left of her few MS Contin pills and oxycodone.  We talked about opioid dependence and how she would likely feel poorly for a few weeks to couple months and then will start feeling better as her brain starts making endogenous opiates again.  I talked about alarming withdrawal symptoms want to seek emergency care for, for instance if she is getting dehydrated.  I recommended she go to .  I recommended close follow-up with her primary care doctor.    She asked insightful questions about this and seem to accept my recommendations and thanked me for the visit.    -The above was transcribed using voice recognition software. While I review and edit the transcription, I may miss errors. Please let me know of any serious mis-transcriptions and I will addend this note.    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     Endometriosis, site unspecified      Gastroesophageal reflux disease      Hyperlipidemia      Impaired fasting glucose      Lymphedema     Right leg     Melanoma (H)     Right gluteal region, s/p pelvic node excision, chemo     Mild intermittent asthma      Osteopenia     Previously on Lupron     PFO (patent foramen ovale)     No history of embolic phenomenon     Past Surgical History:   Procedure Laterality Date     APPENDECTOMY       ARTHROSCOPY KNEE Left 12/27/2017    Procedure: ARTHROSCOPY KNEE;  left knee  arthroscopy  partial medial meniscectomy;  Surgeon: Freddy Sims MD;  Location:  [facility]     CHOLECYSTECTOMY, LAPOROSCOPIC  5/2010     HC ENLARGE BREAST WITH IMPLANT       SOFT TISSUE SURGERY      18 surgeries for melanoma right gluteal region, needle biopsies     SURGICAL HISTORY OF -   2001    Dx-lap - endometriosis - X7     SURGICAL HISTORY OF -   9/2009    Right knee arthroscopy, removal of right tibial screw.     Allergies   Allergen Reactions     Compazine      tachycardia     Demerol Itching          Medications:   I have reviewed this patient's medication profile.  MNPMP: reviewed      Data reviewed:  I reviewed recent labs and imaging, comments on pertinent findings:    Thank you for involving us in the patient's care. Over 60 min face to face time today over half counseling  Sohan Pedersen MD / Palliative Medicine / Pager [phone] / [facility] Inpatient Team Consult Pager [phone] (answered 8am-430pm M-F) - ok to text page via [employer] / After-Hours Answering Service [phone] / Palliative Clinic in the [facility] at the [facility] - [phone] (scheduling); [phone] (triage).        Again, thank you for allowing me to participate in the care of your patient.      Sincerely,    Sohan Pedersen MD

## 2018-06-16 ENCOUNTER — HEALTH MAINTENANCE LETTER (OUTPATIENT)
Age: 48
End: 2018-06-16

## 2019-01-16 ENCOUNTER — APPOINTMENT (OUTPATIENT)
Dept: CT IMAGING | Facility: CLINIC | Age: 49
End: 2019-01-16
Payer: COMMERCIAL

## 2019-01-16 ENCOUNTER — HOSPITAL ENCOUNTER (EMERGENCY)
Facility: CLINIC | Age: 49
Discharge: HOME | End: 2019-01-16
Attending: EMERGENCY MEDICINE | Admitting: EMERGENCY MEDICINE
Payer: COMMERCIAL

## 2019-01-16 VITALS
HEART RATE: 90 BPM | SYSTOLIC BLOOD PRESSURE: 111 MMHG | HEIGHT: 64 IN | BODY MASS INDEX: 30.39 KG/M2 | RESPIRATION RATE: 20 BRPM | WEIGHT: 178 LBS | DIASTOLIC BLOOD PRESSURE: 76 MMHG | OXYGEN SATURATION: 98 % | TEMPERATURE: 97.8 F

## 2019-01-16 DIAGNOSIS — R10.12 LUQ ABDOMINAL PAIN: ICD-10-CM

## 2019-01-16 DIAGNOSIS — J02.0 STREP THROAT: ICD-10-CM

## 2019-01-16 LAB
ALBUMIN SERPL-MCNC: 3.1 G/DL (ref 3.4–5)
ALP SERPL-CCNC: 122 U/L (ref 40–150)
ALT SERPL W P-5'-P-CCNC: 40 U/L (ref 0–50)
ANION GAP SERPL CALCULATED.3IONS-SCNC: 9 MMOL/L (ref 3–14)
AST SERPL W P-5'-P-CCNC: 26 U/L (ref 0–45)
BASOPHILS # BLD AUTO: 0 10E9/L (ref 0–0.2)
BASOPHILS NFR BLD AUTO: 0.4 %
BILIRUB SERPL-MCNC: 0.2 MG/DL (ref 0.2–1.3)
BUN SERPL-MCNC: 14 MG/DL (ref 7–30)
CALCIUM SERPL-MCNC: 8.3 MG/DL (ref 8.5–10.1)
CHLORIDE SERPL-SCNC: 108 MMOL/L (ref 94–109)
CO2 SERPL-SCNC: 24 MMOL/L (ref 20–32)
CREAT SERPL-MCNC: 0.92 MG/DL (ref 0.52–1.04)
DIFFERENTIAL METHOD BLD: ABNORMAL
EOSINOPHIL # BLD AUTO: 0.1 10E9/L (ref 0–0.7)
EOSINOPHIL NFR BLD AUTO: 1.3 %
ERYTHROCYTE [DISTWIDTH] IN BLOOD BY AUTOMATED COUNT: 15.2 % (ref 10–15)
GFR SERPL CREATININE-BSD FRML MDRD: 73 ML/MIN/{1.73_M2}
GLUCOSE SERPL-MCNC: 112 MG/DL (ref 70–99)
HCT VFR BLD AUTO: 34 % (ref 35–47)
HETEROPH AB SER QL: NEGATIVE
HGB BLD-MCNC: 11.4 G/DL (ref 11.7–15.7)
IMM GRANULOCYTES # BLD: 0 10E9/L (ref 0–0.4)
IMM GRANULOCYTES NFR BLD: 0.2 %
LACTATE BLD-SCNC: 0.4 MMOL/L (ref 0.7–2)
LIPASE SERPL-CCNC: 120 U/L (ref 73–393)
LYMPHOCYTES # BLD AUTO: 2.1 10E9/L (ref 0.8–5.3)
LYMPHOCYTES NFR BLD AUTO: 19 %
MCH RBC QN AUTO: 28.7 PG (ref 26.5–33)
MCHC RBC AUTO-ENTMCNC: 33.5 G/DL (ref 31.5–36.5)
MCV RBC AUTO: 86 FL (ref 78–100)
MONOCYTES # BLD AUTO: 0.9 10E9/L (ref 0–1.3)
MONOCYTES NFR BLD AUTO: 8.4 %
NEUTROPHILS # BLD AUTO: 7.8 10E9/L (ref 1.6–8.3)
NEUTROPHILS NFR BLD AUTO: 70.7 %
NRBC # BLD AUTO: 0 10*3/UL
NRBC BLD AUTO-RTO: 0 /100
PLATELET # BLD AUTO: 199 10E9/L (ref 150–450)
POTASSIUM SERPL-SCNC: 3.6 MMOL/L (ref 3.4–5.3)
PROT SERPL-MCNC: 7 G/DL (ref 6.8–8.8)
RBC # BLD AUTO: 3.97 10E12/L (ref 3.8–5.2)
SODIUM SERPL-SCNC: 141 MMOL/L (ref 133–144)
TROPONIN I SERPL-MCNC: <0.015 UG/L (ref 0–0.04)
WBC # BLD AUTO: 11 10E9/L (ref 4–11)

## 2019-01-16 PROCEDURE — 25000125 ZZHC RX 250: Performed by: EMERGENCY MEDICINE

## 2019-01-16 PROCEDURE — A9270 NON-COVERED ITEM OR SERVICE: HCPCS | Mod: GY | Performed by: EMERGENCY MEDICINE

## 2019-01-16 PROCEDURE — 25000128 H RX IP 250 OP 636: Performed by: EMERGENCY MEDICINE

## 2019-01-16 PROCEDURE — 86308 HETEROPHILE ANTIBODY SCREEN: CPT | Performed by: EMERGENCY MEDICINE

## 2019-01-16 PROCEDURE — 83690 ASSAY OF LIPASE: CPT | Performed by: EMERGENCY MEDICINE

## 2019-01-16 PROCEDURE — 25000132 ZZH RX MED GY IP 250 OP 250 PS 637: Mod: GY | Performed by: EMERGENCY MEDICINE

## 2019-01-16 PROCEDURE — 99285 EMERGENCY DEPT VISIT HI MDM: CPT | Mod: 25

## 2019-01-16 PROCEDURE — 83605 ASSAY OF LACTIC ACID: CPT | Performed by: EMERGENCY MEDICINE

## 2019-01-16 PROCEDURE — 80053 COMPREHEN METABOLIC PANEL: CPT | Performed by: EMERGENCY MEDICINE

## 2019-01-16 PROCEDURE — 74177 CT ABD & PELVIS W/CONTRAST: CPT

## 2019-01-16 PROCEDURE — 85025 COMPLETE CBC W/AUTO DIFF WBC: CPT | Performed by: EMERGENCY MEDICINE

## 2019-01-16 PROCEDURE — 84484 ASSAY OF TROPONIN QUANT: CPT | Performed by: EMERGENCY MEDICINE

## 2019-01-16 RX ORDER — OXYCODONE AND ACETAMINOPHEN 5; 325 MG/1; MG/1
1 TABLET ORAL ONCE
Status: COMPLETED | OUTPATIENT
Start: 2019-01-16 | End: 2019-01-16

## 2019-01-16 RX ORDER — AZITHROMYCIN 250 MG/1
TABLET, FILM COATED ORAL
Qty: 6 TABLET | Refills: 0 | Status: SHIPPED | OUTPATIENT
Start: 2019-01-16 | End: 2019-01-21

## 2019-01-16 RX ORDER — IOPAMIDOL 755 MG/ML
88 INJECTION, SOLUTION INTRAVASCULAR ONCE
Status: COMPLETED | OUTPATIENT
Start: 2019-01-16 | End: 2019-01-16

## 2019-01-16 RX ADMIN — OXYCODONE HYDROCHLORIDE AND ACETAMINOPHEN 1 TABLET: 5; 325 TABLET ORAL at 12:01

## 2019-01-16 RX ADMIN — IOPAMIDOL 88 ML: 755 INJECTION, SOLUTION INTRAVENOUS at 13:12

## 2019-01-16 RX ADMIN — SODIUM CHLORIDE 65 ML: 9 INJECTION, SOLUTION INTRAVENOUS at 13:12

## 2019-01-16 ASSESSMENT — ENCOUNTER SYMPTOMS
SORE THROAT: 1
ABDOMINAL DISTENTION: 1
CONSTIPATION: 0
ABDOMINAL PAIN: 1
ROS GI COMMENTS: BOWEL INCONTINENCE
FEVER: 1

## 2019-01-16 ASSESSMENT — MIFFLIN-ST. JEOR: SCORE: 1417.4

## 2019-01-16 NOTE — ED AVS SNAPSHOT
Emergency Department  [address] [place] [address]  Phone:  [phone]  Fax:  [phone]                                    Sivan Faye   MRN: [id number]    Department:   Emergency Department   Date of Visit:  1/16/2019           After Visit Summary Signature Page    I have received my discharge instructions, and my questions have been answered. I have discussed any challenges I see with this plan with the nurse or doctor.    ..........................................................................................................................................  Patient/Patient Representative Signature      ..........................................................................................................................................  Patient Representative Print Name and Relationship to Patient    ..................................................               ................................................  Date                                   Time    ..........................................................................................................................................  Reviewed by Signature/Title    ...................................................              ..............................................  Date                                               Time          22EPIC Rev 08/18

## 2019-01-16 NOTE — ED PROVIDER NOTES
History     Chief Complaint:  Abdominal Pain     HPI   Sivan Faye is a 48 year old female with stage IV melanoma currently not receiving treatment and with a history of hyperlipidemia who presents to the emergency department today for evaluation of abdominal pain. Per chart review, patient was seen at urgent care earlier today for a sore throat for which she was swabbed positive for strep and recommended to be seen in the emergency department for complaints of abdominal pain. Here, she reports left upper quadrant abdominal pain with associated distention. She describes that she was not able to make it to the bathroom last night, but does not think she is constipated. She also has a fever related to her strep. For her melanoma, she currently has a PET/CT Q3 months. Her initial melanoma presented on her right buttocks. She has had no recent evidence of disease progression.     Allergies:  Compazine  Demerol     Medications:    Xanax  Amitriptyline  Fluoxetine  Seroquel    Past Medical History:    Endometriosis of pelvic peritoneum  Osteopenia  GERD  Hyperlipidemia  Neuropathic pain  Asthma  Anxiety  Depressive personality disorder  Coxsackievirus infection  Melanoma  Patent formaen ovale    Past Surgical History:    Appendectomy  Left knee replacement  Cholecystectomy  Right knee surgery  Enlarge breast with implant  18 surgeries for melanoma right gluteal region, needle biopsies  Endometriosis surgery    Family History:    Father: Heart disease, depression   Mother: Diabetes  Sister: Diabetes, melanoma, depression   Maternal Grandmother: Type II diabetes   Other family history of melanoma.     Social History:  Smoking Status: Former Smoker  Smokeless Tobacco: Never Used  Alcohol Use: Positive  Drug Use: Negative   Marital Status:        Review of Systems   Constitutional: Positive for fever.   HENT: Positive for sore throat.    Gastrointestinal: Positive for abdominal distention and abdominal pain.  "Negative for constipation.        Bowel incontinence   10 point review of systems performed and is negative except as above and in HPI.      Physical Exam     Patient Vitals for the past 24 hrs:   BP Temp Temp src Heart Rate Resp SpO2 Height Weight   01/16/19 1058 125/57 97.8  F (36.6  C) Oral 89 20 98 % 1.626 m (5' 4\") 80.7 kg (178 lb)     Physical Exam  General: Resting on the gurney, appears minimally uncomfortable  Head:  The scalp, face, and head appear normal  Mouth/Throat: Mucus membranes are moist  CV:  Regular rate    Normal S1 and S2  No pathological murmur   Resp:  Breath sounds clear and equal bilaterally    Non-labored, no retractions or accessory muscle use    No coarseness    No wheezing   GI:  Abdomen is soft, no rigidity    Diffuse abdominal tenderness to palpation, worse in the left upper and right lower quadrant. No guarding or rebound.   MS:  Normal motor assessment of all extremities.    Good capillary refill noted.  Skin:   No rash or lesions noted.  Neuro:   Speech is normal and fluent. No apparent deficit.  Psych: Awake. Alert.  Normal affect.      Appropriate interactions.    Emergency Department Course     Imaging:  Radiology findings were communicated with the patient who voiced understanding of the findings.    CT Abdomen Pelvis w Contrast  1. No acute abnormality or evidence for metastatic disease.  2. Fatty infiltration of the liver.    ARMIN EVANS MD  Reading per radiology     Laboratory:  Laboratory findings were communicated with the patient who voiced understanding of the findings.    CBC: WBC 11.0, HGB 11.4(L),   CMP: Glucose 112(H), Calcium 8.3(L), Albumin 3.1(L) o/w WNL (Creatinine 0.92)  Lipase: 230  Lactic Acid (Resulted 1156): 0.4(L)   Troponin (Collected 1143): <0.015    Mononucleosis screen: Negative     Interventions:  1149 Iohexol 25 mL Oral   1201 Percocet 1 tablet Oral   1220 Iohexol 25 mL Oral     Emergency Department Course:    1103 Nursing notes and vitals " reviewed.    1106 I performed an exam of the patient as documented above.     1143 IV was inserted and blood was drawn for laboratory testing, results above.    1307 The patient was sent for a CT while in the emergency department, results above.     1337 Rechecked and updated.      1405 I personally reviewed the lab and imaging results with the patient and answered all related questions prior to discharge.    Impression & Plan      Medical Decision Making:  Sivan Faye is a 48 year old female who presents to the emergency department today for evaluation of left upper quadrant abdominal pain.  The patient was seen in urgent care earlier today and was diagnosed with strep throat however the provider was not comfortable starting her on antibiotics as she was concerned about her abdominal pain with her melanoma diagnosis.  On my evaluation she did have abdominal tenderness.  CT imaging was obtained and was unremarkable.  The setting of negative workup is comfortable starting her on antibiotics and prescribed a Z-Momo for her.  She is comfortable this plan will follow-up as an outpatient.    Diagnosis:    ICD-10-CM    1. LUQ abdominal pain R10.12    2. Strep throat J02.0      Disposition:   The patient is discharged to home.    Discharge Medications:  START taking      Dose / Directions   azithromycin 250 MG tablet  Commonly known as:  ZITHROMAX Z-MOMO      Two tablets on the first day, then one tablet daily for the next 4 days  Quantity:  6 tablet  Refills:  0       Scribe Disclosure:  Kitty GIBSON, am serving as a scribe at 11:07 AM on 1/16/2019 to document services personally performed by Liana Zhang MD based on my observations and the provider's statements to me.       EMERGENCY DEPARTMENT       Liana Zhang MD  01/16/19 7450

## 2019-01-16 NOTE — DISCHARGE INSTRUCTIONS
Discharge Instructions  Sore Throat  You were seen today for a sore throat.  Most (>80%) sore throats are caused by a virus. Antibiotics do not help with viral infections, but you can fight off the virus on your own.  In this case, your sore throat would be treated with medications for your pain and fever.    Strep throat is a kind of sore throat caused by Group A streptococcus bacteria.  This type of sore throat is treated with antibiotics.  If you had a rapid test done today for strep throat and it did not show infection, a culture is done in some cases. The culture can take several days to complete. If the culture shows you have strep throat, we will call you and get you a prescription for antibiotics. We will not contact you with a negative culture result.  Generally, every Emergency Department visit should have a follow-up clinic visit with either a primary or a specialty clinic/provider. Please follow-up as instructed by your emergency provider today.  Return to the Emergency Department if:  If you have difficulty breathing.  If you are drooling because you are unable to swallow.  You become dehydrated due to difficulty drinking. Signs of dehydration include weakness, dry mouth, and urinating less than 3 times per day.  If you develop swelling of the neck or tongue.  If you develop a high fever with either severe or unusual headache or stiff neck.    Treatment:    Pain relief -- Non-prescription pain medications, such as Tylenol  (acetaminophen) or Motrin , Advil  (ibuprofen) are usually recommended for pain.  Do not use a medicine that you are allergic to, or if your provider has told you not to use it.  Soft or liquid diet. Concentrate on liquids to keep yourself hydrated. Cold liquids (popsicles, ice cream, etc.) may feel good on your throat.  If you were given a prescription for medicine here today, be sure to read all of the information (including the package insert) that comes with your prescription.   This will include important information about the medicine, its side effects, and any warnings that you need to know about.  The pharmacist who fills the prescription can provide more information and answer questions you may have about the medicine.  If you have questions or concerns that the pharmacist cannot address, please call or return to the Emergency Department.   Remember that you can always come back to the Emergency Department if you are not able to see your regular provider in the amount of time listed above, if you get any new symptoms, or if there is anything that worries you.  Discharge Instructions  Abdominal Pain    Abdominal pain (belly pain) can be caused by many things. Your evaluation today does not show the exact cause for your pain. Your provider today has decided that it is unlikely your pain is due to a life threatening problem, or a problem requiring surgery or hospital admission. Sometimes those problems cannot be found right away, so it is very important that you follow up as directed.  Sometimes only the changes which occur over time allow the cause of your pain to be found.    Generally, every Emergency Department visit should have a follow-up clinic visit with either a primary or a specialty clinic/provider. Please follow-up as instructed by your emergency provider today. With abdominal pain, we often recommend very close follow-up, such as the following day.    ADULTS:  Return to the Emergency Department right away if:    You get an oral temperature above 102oF or as directed by your provider.  You have blood in your stools. This may be bright red or appear as black, tarry stools.    You keep vomiting (throwing up) or cannot drink liquids.  You see blood when you vomit.   You cannot have a bowel movement or you cannot pass gas.  Your stomach gets bloated or bigger.  Your skin or the whites of your eyes look yellow.  You faint.  You have bloody, frequent or painful urination  (peeing).  You have new symptoms or anything that worries you.    CHILDREN:  Return to the Emergency Department right away if your child has any of the above-listed symptoms or the following:    Pushes your hand away or screams/cries when his/her belly is touched.  You notice your child is very fussy or weak.  Your child is very tired and is too tired to eat or drink.  Your child is dehydrated.  Signs of dehydration can be:  Significant change in the amount of wet diapers/urine.  Your infant or child starts to have dry mouth and lips, or no saliva (spit) or tears.    PREGNANT WOMEN:  Return to the Emergency Department right away if you have any of the above-listed symptoms or the following:    You have bleeding, leaking fluid or passing tissue from the vagina.  You have worse pain or cramping, or pain in your shoulder or back.  You have vomiting that will not stop.  You have a temperature of 100oF or more.  Your baby is not moving as much as usual.  You faint.  You get a bad headache with or without eye problems and abdominal pain.  You have a seizure.  You have unusual discharge from your vagina and abdominal pain.    Abdominal pain is pretty common during pregnancy.  Your pain may or may not be related to your pregnancy. You should follow-up closely with your OB provider so they can evaluate you and your baby.  Until you follow-up with your regular provider, do the following:     Avoid sex and do not put anything in your vagina.  Drink clear fluids.  Only take medications approved by your provider.    MORE INFORMATION:    Appendicitis:  A possible cause of abdominal pain in any person who still has their appendix is acute appendicitis. Appendicitis is often hard to diagnose.  Testing does not always rule out early appendicitis or other causes of abdominal pain. Close follow-up with your provider and re-evaluations may be needed to figure out the reason for your abdominal pain.    Follow-up:  It is very important  "that you make an appointment with your clinic and go to the appointment.  If you do not follow-up with your primary provider, it may result in missing an important development which could result in permanent injury or disability and/or lasting pain.  If there is any problem keeping your appointment, call your provider or return to the Emergency Department.    Medications:  Take your medications as directed by your provider today.  Before using over-the-counter medications, ask your provider and make sure to take the medications as directed.  If you have any questions about medications, ask your provider.    Diet:  Resume your normal diet as much as possible, but do not eat fried, fatty or spicy foods while you have pain.  Do not drink alcohol or have caffeine.  Do not smoke tobacco.    Probiotics: If you have been given an antibiotic, you may want to also take a probiotic pill or eat yogurt with live cultures. Probiotics have \"good bacteria\" to help your intestines stay healthy. Studies have shown that probiotics help prevent diarrhea (loose stools) and other intestine problems (including C. diff infection) when you take antibiotics. You can buy these without a prescription in the pharmacy section of the store.     If you were given a prescription for medicine here today, be sure to read all of the information (including the package insert) that comes with your prescription.  This will include important information about the medicine, its side effects, and any warnings that you need to know about.  The pharmacist who fills the prescription can provide more information and answer questions you may have about the medicine.  If you have questions or concerns that the pharmacist cannot address, please call or return to the Emergency Department.       Remember that you can always come back to the Emergency Department if you are not able to see your regular provider in the amount of time listed above, if you get any new " symptoms, or if there is anything that worries you.

## 2021-03-03 ENCOUNTER — TELEPHONE (OUTPATIENT)
Dept: SCHEDULING | Age: 51
End: 2021-03-03

## 2021-03-03 ENCOUNTER — WALK IN (OUTPATIENT)
Dept: URGENT CARE | Age: 51
End: 2021-03-03

## 2021-03-03 VITALS — TEMPERATURE: 98.6 F

## 2021-03-03 DIAGNOSIS — Z11.1 SCREENING-PULMONARY TB: Primary | ICD-10-CM

## 2021-03-03 PROCEDURE — 86580 TB INTRADERMAL TEST: CPT | Performed by: NURSE PRACTITIONER

## 2021-03-05 ENCOUNTER — WALK IN (OUTPATIENT)
Dept: URGENT CARE | Age: 51
End: 2021-03-05

## 2021-03-05 VITALS — TEMPERATURE: 97.5 F

## 2021-03-05 DIAGNOSIS — Z11.1 ENCOUNTER FOR PPD SKIN TEST READING: Primary | ICD-10-CM

## 2021-03-05 LAB
INDURATION: 0 MM (ref 0–?)
SKIN TEST RESULT: NEGATIVE

## 2021-03-05 PROCEDURE — X1094 NO CHARGE VISIT: HCPCS | Performed by: NURSE PRACTITIONER

## 2021-05-31 ENCOUNTER — RECORDS - HEALTHEAST (OUTPATIENT)
Dept: ADMINISTRATIVE | Facility: CLINIC | Age: 51
End: 2021-05-31

## (undated) DEVICE — WRAP EZY KNEE

## (undated) DEVICE — DRAPE SHEET REV FOLD 3/4 9349

## (undated) DEVICE — PACK ARTHROSCOPY KNEE SOP15AKFSM

## (undated) DEVICE — BNDG ELASTIC 6"X5YDS UNSTERILE 6611-60

## (undated) DEVICE — BLADE SHAVER ARTHRO 4.2MM CUDA C9254

## (undated) DEVICE — NDL 19GA 1.5" FILTER 305200

## (undated) DEVICE — PREP DURAPREP 26ML APL 8630

## (undated) DEVICE — TUBING ARTHRO CONMED/LINVATEC PUMP BLUE INFLOW 10K100

## (undated) DEVICE — DRSG STERI STRIP 1/2X4" R1547

## (undated) DEVICE — BLADE CLIPPER 4412A

## (undated) DEVICE — SYR 03ML LL W/O NDL 309657

## (undated) DEVICE — GLOVE PROTEXIS POWDER FREE 8.0 ORTHOPEDIC 2D73ET80

## (undated) DEVICE — GLOVE PROTEXIS BLUE W/NEU-THERA 8.0  2D73EB80

## (undated) DEVICE — MANIFOLD NEPTUNE 4 PORT 700-20

## (undated) DEVICE — SU MONOCRYL 4-0 PS-2 18" UND Y496G

## (undated) DEVICE — NDL 19GA 1.5"

## (undated) DEVICE — SOL ADH LIQUID BENZOIN SWAB 0.6ML C1544

## (undated) DEVICE — SOL NACL 0.9% IRRIG 3000ML BAG 2B7477

## (undated) DEVICE — LINEN TOWEL PACK X5 5464

## (undated) DEVICE — CAST PADDING 6" UNSTERILE 9046

## (undated) DEVICE — GLOVE PROTEXIS POWDER FREE 7.5 ORTHOPEDIC 2D73ET75

## (undated) RX ORDER — LIDOCAINE HYDROCHLORIDE 20 MG/ML
INJECTION, SOLUTION EPIDURAL; INFILTRATION; INTRACAUDAL; PERINEURAL
Status: DISPENSED
Start: 2017-12-27

## (undated) RX ORDER — PROPOFOL 10 MG/ML
INJECTION, EMULSION INTRAVENOUS
Status: DISPENSED
Start: 2017-12-27

## (undated) RX ORDER — HYDROMORPHONE HYDROCHLORIDE 1 MG/ML
INJECTION, SOLUTION INTRAMUSCULAR; INTRAVENOUS; SUBCUTANEOUS
Status: DISPENSED
Start: 2017-12-27

## (undated) RX ORDER — DEXAMETHASONE SODIUM PHOSPHATE 4 MG/ML
INJECTION, SOLUTION INTRA-ARTICULAR; INTRALESIONAL; INTRAMUSCULAR; INTRAVENOUS; SOFT TISSUE
Status: DISPENSED
Start: 2017-12-27

## (undated) RX ORDER — OXYCODONE HYDROCHLORIDE 5 MG/1
TABLET ORAL
Status: DISPENSED
Start: 2017-12-27

## (undated) RX ORDER — FENTANYL CITRATE 50 UG/ML
INJECTION, SOLUTION INTRAMUSCULAR; INTRAVENOUS
Status: DISPENSED
Start: 2017-12-27

## (undated) RX ORDER — ONDANSETRON 2 MG/ML
INJECTION INTRAMUSCULAR; INTRAVENOUS
Status: DISPENSED
Start: 2017-12-27

## (undated) RX ORDER — CEFAZOLIN SODIUM 2 G/100ML
INJECTION, SOLUTION INTRAVENOUS
Status: DISPENSED
Start: 2017-12-27

## (undated) RX ORDER — ACETAMINOPHEN 325 MG/1
TABLET ORAL
Status: DISPENSED
Start: 2017-12-27